# Patient Record
Sex: FEMALE | Race: WHITE | Employment: OTHER | URBAN - METROPOLITAN AREA
[De-identification: names, ages, dates, MRNs, and addresses within clinical notes are randomized per-mention and may not be internally consistent; named-entity substitution may affect disease eponyms.]

---

## 2017-02-21 ENCOUNTER — TELEPHONE (OUTPATIENT)
Dept: CARDIOLOGY CLINIC | Age: 62
End: 2017-02-21

## 2017-12-07 ENCOUNTER — OFFICE VISIT (OUTPATIENT)
Dept: CARDIOLOGY CLINIC | Age: 62
End: 2017-12-07

## 2017-12-07 VITALS
WEIGHT: 159 LBS | SYSTOLIC BLOOD PRESSURE: 138 MMHG | DIASTOLIC BLOOD PRESSURE: 82 MMHG | HEART RATE: 80 BPM | HEIGHT: 58 IN | BODY MASS INDEX: 33.37 KG/M2

## 2017-12-07 DIAGNOSIS — I10 ESSENTIAL HYPERTENSION: ICD-10-CM

## 2017-12-07 DIAGNOSIS — R55 SYNCOPE AND COLLAPSE: ICD-10-CM

## 2017-12-07 DIAGNOSIS — Z72.0 TOBACCO ABUSE: Primary | ICD-10-CM

## 2017-12-07 DIAGNOSIS — R42 DIZZY SPELLS: ICD-10-CM

## 2017-12-07 PROCEDURE — 99214 OFFICE O/P EST MOD 30 MIN: CPT | Performed by: INTERNAL MEDICINE

## 2017-12-07 NOTE — PROGRESS NOTES
Alejandra Busby  1955  Tim Angry, DO    Chief complaint and HPI:  Alejandra Busby  this 72-year-old female is here for follow-up for syncope. She described the episode that she had a bowel movement and felt dizzy and she fell in the bathroom. She states that she did not pass out. She went to [facility] in [place] and I have reviewed the records from that hospital.  She had carotid ultrasound and echocardiogram which were unremarkable and the telemetry did not report any arrhythmias. She has significant issues with her back. I have reviewed the results of her facet neuropathy on lumbar MRI from [facility] from June. She has seen Dr. Ministerio Ibarra and had one lumbar injection done and she believes that that messed her right lower side and she has never felt better since then. She is frustrated and depressed that she is not able to obtain her pain medications and she has been told that she has to go through 2 more injections and she does not feel that she wants to do that. She is emotionally labile and she is accompanied with her . She continues to smoke and trying to wean herself off. She denies any chest pain were reports more fatigue and shortness of breath on activity. She states that she had several cardiac cath by Dr. Em Cabrera in the past and she has been told that she did not have any blockages. She was apparently on some cholesterol medications. She did not bring it with her. She was taking it when she had a blood test done on November 20 and had results are reviewed and discussed with her. She stopped the medication because of aches and pains she was having and she did not feel any better as far as the aches and pains are concerned off of the medications. She is willing to go back on it. Cardiovascular system review is otherwise unchanged from prior encounter. Past medical history:  has a past medical history of CAD (coronary artery disease); Dizzy spells;  Family venous pressure: Not elevated. Heart[de-identified] Hear sounds are normal.   Peripheral Pulses: 1+   Extremities: No edema  Chest: Normal  Lungs: Clear to auscultation with diminished air entry   Abdomen: Soft non tender. Bowel sounds are normal. No organomegaly or ascites.   Musculoskeletal: WNL   Skin: Normal in color and texture. No rash   Psychiatric: Normal mood and effect.    Neurologic exam:  No focal deficit    11.2017 At [facility]    RIGHT CAROTID:              Vessel                                       Peak systolic velocities(cm/s):   Proximal right common carotid artery:                69.6 cm/s. Proximal right internal carotid artery:                    80.1 cm/s. ECA :                                                                    88.8 cm/s. Peak end-diastolic velocity of the Proximal Right internal carotid artery: 23 cm/s. ICA/CCA ratio equals 1.3. There is No significant plaque within the right internal carotid artery.         There is antegrade flow within the right vertebral artery with normal velocity. There is triphasic flow within the right subclavian artery with normal velocity.      LEFT CAROTID:              Vessel                                       Peak systolic velocities(cm/s):   Proximal left common carotid artery:               79.5 cm/s. Proximal left internal carotid artery:                       80.8 cm/s. ECA :                                                                    98.8 cm/s. Peak end-diastolic velocity of the Proximal Left internal carotid artery: 22.4 cm/s. ICA/CCA ratio equals 1.2. There is fibrocalcific smooth appearing plaque within the  left internal carotid artery.            There is antegrade flow within the left vertebral artery with normal velocity. There is triphasic flow within the left subclavian artery with normal velocity.      11/2017 ECHO at [facility] reported  1. Left ventricle:  The cavity size was normal. Wall thickness was     normal. Systolic function was normal. The estimated ejection     fraction was in the range of 60% to 65%. The calculated EF is     65%. Wall motion was normal; there were no regional wall motion     abnormalities. 2. Mitral valve: There was mild regurgitation. LAB REVIEW:    Hospital Encounter on 11/26/2017  [facility]")' href=\"[email or web address]://[email or web address]. [id number]/\">11/27/2017   Hospital Encounter on 11/26/2017  [facility]")' href=\"epic://[email or web address]. [id number]/\">11/26/2017   Hospital Encounter on 11/19/2017  [facility]")' href=\"epic://[email or web address]. [id number]/\">11/19/2017   Hospital Encounter on 2/24/2015  [facility]")' href=\"epic://[email or web address]. [id number]/\">2/24/2015   Hospital Encounter on 1/3/2015  [facility]")' href=\"epic://[email or web address]. [id number]/\">1/3/2015   Hospital Encounter on 7/30/2013  [facility]")' href=\"epic://[email or web address]. [id number]/\">7/30/2013   Hospital Encounter on 4/17/2013  [facility]")' href=\"[email or web address]://[email or web address]. [id number]/\">4/17/2013       Specimen: Blood\")'>6.7   Specimen: Blood - Line\")'>9.7   Specimen: Blood - Line\")'>8.0   Specimen: Blood - Vein\")'>13.5 (A)   Specimen: Blood\")'>9.5   Specimen: Blood - Line\")'>8.2   Specimen: Blood - Line\")'>7.5     Specimen: Blood\")'>3.30 (A)   Specimen: Blood - Line\")'>3.59 (A)   Specimen: Blood - Line\")'>3.80 (A)   Specimen: Blood - Vein\")'>4.98   Specimen: Blood\")'>5.12   Specimen: Blood - Line\")'>4.75   Specimen: Blood - Line\")'>4.72     Specimen: Blood\")'>8.9 (A)   Specimen: Blood - Line\")'>10.0 (A)   Specimen: Blood - Line\")'>10.8 (A)   Specimen: Blood - Vein\")'>15.1   Specimen: Blood\")'>15.2   Specimen: Blood - Line\")'>14.7   Specimen: Blood - (A)   Specimen: Blood - Line\")'>0   Specimen: Blood - Line\")'>1     Specimen: Blood\")'>3.4   Specimen: Blood - Line\")'>3.1   Specimen: Blood - Line\")'>3.3   Specimen: Blood - Vein\")'>3.6   Specimen: Blood\")'>4.2 (A)         Specimen: Blood\")'>0.4   Specimen: Blood - Line\")'>0.5        Lipids:     Hospital Encounter on 11/19/2017  [address] [facility]\")' href=\"[email or web address]://[email or web address]. [id number]/\">11/20/2017     View Detailed Result Report  Lipid Panel,CBC and CMP  11/20/2017\")' href=\"epic[email or web address]. [id number] Panel,CBC and CMP/\">  Specimen: Blood\")'>124   View Detailed Result Report  Lipid Panel,CBC and CMP  11/20/2017\")' href=\"[email or web address]. [id number] Panel,CBC and CMP/\">  Specimen: Blood\")'>190 (A)   View Detailed Result Report  Lipid Panel,CBC and CMP  11/20/2017\")' href=\"[email or web address]. [id number] Panel,CBC and CMP/\">  Specimen: Blood\")'>51   View Detailed Result Report  Lipid Panel,CBC and CMP  11/20/2017\")' href=\"[email or web address]. [id number] Panel,CBC and CMP/\">  Specimen: Blood\")'>35   View Detailed Result Report  Lipid Panel,CBC and CMP  11/20/2017\")' href=\"[email or web address]. [id number] Panel,CBC and CMP/\">  Specimen: Blood\")'>38       Specimen: Blood\")'>103   Specimen: Blood - Line\")'>129 (A)     Specimen: Blood - Line\")'>101   Specimen: Blood - Vein\")'>117 (A)   Specimen: Blood\")'>95   Specimen: Blood - Line\")'>116 (H)   Specimen: Blood - Line\")'>90     Specimen: Blood\")'>14   Specimen: Blood - Line\")'>18     Specimen: Blood - Line\")'>15   Specimen: Blood - Vein\")'>18   Specimen: Blood\")'>12   Specimen: Blood - Line\")'>15   Specimen: Blood - Line\")'>10     Specimen: Blood\")'>0.6   Specimen: Blood - Line\")'>0.6     Specimen: Blood - Line\")'>0.6 Specimen: Blood - Vein\")'>0.9   Specimen: Blood\")'>0.8   Specimen: Blood - Line\")'>0.67   Specimen: Blood - Line\")'>0.64     Specimen: Blood\")'>8.0 (A)   Specimen: Blood - Line\")'>8.6     Specimen: Blood - Line\")'>8.5   Specimen: Blood - Vein\")'>8.7   Specimen: Blood\")'>9   Specimen: Blood - Line\")'>9.0   Specimen: Blood - Line\")'>9.3     Specimen: Blood\")'>144   Specimen: Blood - Line\")'>143     Specimen: Blood - Line\")'>142   Specimen: Blood - Vein\")'>140   Specimen: Blood\")'>142   Specimen: Blood - Line\")'>136   Specimen: Blood - Line\")'>140     Specimen: Blood\")'>4.0   Specimen: Blood - Line\")'>3.9     Specimen: Blood - Line\")'>4.1   Specimen: Blood - Vein\")'>3.8   Specimen: Blood\")'>4.3   Specimen: Blood - Line\")'>4.0   Specimen: Blood - Line\")'>4.1     Specimen: Blood\")'>114 (A)   Specimen: Blood - Line\")'>111 (A)     Specimen: Blood - Line\")'>109 (A)   Specimen: Blood - Vein\")'>107   Specimen: Blood\")'>104   Specimen: Blood - Line\")'>106   Specimen: Blood - Line\")'>103     Specimen: Blood\")'>24   Specimen: Blood - Line\")'>25     Specimen: Blood - Line\")'>26   Specimen: Blood - Vein\")'>26   Specimen: Blood\")'>29   Specimen: Blood - Line\")'>25   Specimen: Blood - Line\")'>25     Specimen: Blood\")'>6 (A)   Specimen: Blood - Line\")'>7     Specimen: Blood - Line\")'>7   Specimen: Blood - Vein\")'>7   Specimen: Blood\")'>9   Specimen: Blood - Line\")'>4   Specimen: Blood - Line\")'>11                IMPRESSION and RECOMMENDATIONS:      Tobacco abuse  Patient is counseled for smoking cessation and refraining from passive nicotine exposure. I have offered multiple approaches and support to accomplish this. Syncope and collapse  30 day one monitor is recommended and we also discussed implantable loop recorder she has declined both were normal think about them. Hypertension  Well controlled on current medications, reviewed individually with patient. 30 day event monitor to evaluate syncope.  Patient is

## 2017-12-07 NOTE — ASSESSMENT & PLAN NOTE
Patient is counseled for smoking cessation and refraining from passive nicotine exposure. I have offered multiple approaches and support to accomplish this.

## 2018-04-04 ENCOUNTER — OFFICE VISIT (OUTPATIENT)
Dept: CARDIOLOGY CLINIC | Age: 63
End: 2018-04-04

## 2018-04-04 ENCOUNTER — NURSE ONLY (OUTPATIENT)
Dept: CARDIOLOGY CLINIC | Age: 63
End: 2018-04-04

## 2018-04-04 VITALS
HEIGHT: 58 IN | HEART RATE: 88 BPM | SYSTOLIC BLOOD PRESSURE: 110 MMHG | WEIGHT: 169 LBS | BODY MASS INDEX: 35.48 KG/M2 | DIASTOLIC BLOOD PRESSURE: 60 MMHG

## 2018-04-04 DIAGNOSIS — E78.1 PURE HYPERGLYCERIDEMIA: ICD-10-CM

## 2018-04-04 DIAGNOSIS — R55 SYNCOPE AND COLLAPSE: Primary | ICD-10-CM

## 2018-04-04 DIAGNOSIS — Z72.0 TOBACCO ABUSE: ICD-10-CM

## 2018-04-04 DIAGNOSIS — I10 ESSENTIAL HYPERTENSION: ICD-10-CM

## 2018-04-04 DIAGNOSIS — R07.9 CHEST PAIN, UNSPECIFIED TYPE: ICD-10-CM

## 2018-04-04 DIAGNOSIS — R07.9 CHEST PAIN, UNSPECIFIED TYPE: Primary | ICD-10-CM

## 2018-04-04 DIAGNOSIS — R00.2 PALPITATIONS: ICD-10-CM

## 2018-04-04 DIAGNOSIS — R06.02 SOB (SHORTNESS OF BREATH) ON EXERTION: ICD-10-CM

## 2018-04-04 PROCEDURE — 99214 OFFICE O/P EST MOD 30 MIN: CPT | Performed by: INTERNAL MEDICINE

## 2018-04-04 PROCEDURE — 93000 ELECTROCARDIOGRAM COMPLETE: CPT | Performed by: INTERNAL MEDICINE

## 2018-04-04 RX ORDER — ROPINIROLE 1 MG/1
1 TABLET, FILM COATED ORAL 3 TIMES DAILY
Refills: 0 | COMMUNITY
Start: 2017-12-31

## 2018-04-04 RX ORDER — BUDESONIDE AND FORMOTEROL FUMARATE DIHYDRATE 160; 4.5 UG/1; UG/1
2 AEROSOL RESPIRATORY (INHALATION) 2 TIMES DAILY
COMMUNITY
Start: 2018-04-03 | End: 2018-05-10

## 2018-04-04 RX ORDER — MELOXICAM 15 MG/1
1 TABLET ORAL DAILY
Refills: 2 | COMMUNITY
Start: 2018-03-14

## 2018-04-04 RX ORDER — HYDROCODONE BITARTRATE AND ACETAMINOPHEN 5; 325 MG/1; MG/1
1 TABLET ORAL EVERY 6 HOURS PRN
Refills: 0 | COMMUNITY
Start: 2018-03-13

## 2018-05-10 ENCOUNTER — OFFICE VISIT (OUTPATIENT)
Dept: CARDIOLOGY CLINIC | Age: 63
End: 2018-05-10

## 2018-05-10 VITALS
BODY MASS INDEX: 35.26 KG/M2 | SYSTOLIC BLOOD PRESSURE: 138 MMHG | WEIGHT: 168 LBS | HEART RATE: 80 BPM | DIASTOLIC BLOOD PRESSURE: 78 MMHG | HEIGHT: 58 IN

## 2018-05-10 DIAGNOSIS — Z82.49 FAMILY HISTORY OF CORONARY ARTERY DISEASE: ICD-10-CM

## 2018-05-10 DIAGNOSIS — R00.2 PALPITATIONS: ICD-10-CM

## 2018-05-10 DIAGNOSIS — Z72.0 TOBACCO ABUSE: Primary | ICD-10-CM

## 2018-05-10 PROCEDURE — 99213 OFFICE O/P EST LOW 20 MIN: CPT | Performed by: INTERNAL MEDICINE

## 2019-01-09 ENCOUNTER — TELEPHONE (OUTPATIENT)
Dept: CARDIOLOGY CLINIC | Age: 64
End: 2019-01-09

## 2019-11-26 ENCOUNTER — HOSPITAL ENCOUNTER (OUTPATIENT)
Dept: WOMENS IMAGING | Age: 64
Discharge: HOME | End: 2019-11-26
Payer: COMMERCIAL

## 2019-11-26 DIAGNOSIS — Z12.31 VISIT FOR SCREENING MAMMOGRAM: ICD-10-CM

## 2019-11-26 PROCEDURE — 77067 SCR MAMMO BI INCL CAD: CPT

## 2023-02-22 ENCOUNTER — OFFICE (OUTPATIENT)
Dept: URBAN - METROPOLITAN AREA CLINIC 16 | Facility: CLINIC | Age: 68
End: 2023-02-22

## 2023-02-22 VITALS
SYSTOLIC BLOOD PRESSURE: 144 MMHG | WEIGHT: 210 LBS | HEART RATE: 74 BPM | OXYGEN SATURATION: 96 % | DIASTOLIC BLOOD PRESSURE: 82 MMHG | HEIGHT: 58 IN

## 2023-02-22 DIAGNOSIS — K57.90 DIVERTICULOSIS OF INTESTINE, PART UNSPECIFIED, WITHOUT PERFO: ICD-10-CM

## 2023-02-22 DIAGNOSIS — K58.2 MIXED IRRITABLE BOWEL SYNDROME: ICD-10-CM

## 2023-02-22 DIAGNOSIS — Z86.010 PERSONAL HISTORY OF COLONIC POLYPS: ICD-10-CM

## 2023-02-22 DIAGNOSIS — K55.20 ANGIODYSPLASIA OF COLON WITHOUT HEMORRHAGE: ICD-10-CM

## 2023-02-22 DIAGNOSIS — R19.4 CHANGE IN BOWEL HABIT: ICD-10-CM

## 2023-02-22 DIAGNOSIS — K64.9 UNSPECIFIED HEMORRHOIDS: ICD-10-CM

## 2023-02-22 PROCEDURE — 99215 OFFICE O/P EST HI 40 MIN: CPT | Performed by: INTERNAL MEDICINE

## 2023-02-22 NOTE — SERVICENOTES
she may have had worsening of her bowels with worsening hemorrhoidal disease and will have medical treatment for hemorrhoids in regards to fluctuations of bowels she will have fiber lactose-free and probiotic as well.  I will start her on 10 mg dicyclomine in addition to hemorrhoidal management with fluctuations of her bowels and some cramping as well

## 2023-02-22 NOTE — SERVICEHPINOTES
Geena Bird   is a   67   year old   female   patient who is seen   at the request of   Tutu Saucedo   for a consultation/initial visit.She did have colonoscopies in March of 2/20/2021 and April 2021. She does have a history of AVM disease and bleeding AVMs from the colon and her last colonoscopy in April 15, 2021 had colonoscopy with APC of bleeding AVM in the ascending colon snare polypectomy fulguration of some small polyps she had that done with my partner Dr. Bailey at [facility]. She had some mild left-sided diverticulosis on that exam as well. On examination in March of that year she had a cecal AVM and bleeding ascending colon AVM and was managed at that time as well. She had 2 hemoclips placed at that time Dr. 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 She has not been on PPI at this time  . She has high risk adenoma and sessile serrated adenoma plus family history of colon cancer and polyps and colonoscopy otherwise would be due no later than April 2026

## 2024-12-02 ENCOUNTER — APPOINTMENT (OUTPATIENT)
Facility: HOSPITAL | Age: 69
End: 2024-12-02
Payer: COMMERCIAL

## 2024-12-02 ENCOUNTER — HOSPITAL ENCOUNTER (EMERGENCY)
Facility: HOSPITAL | Age: 69
Discharge: HOME | End: 2024-12-02
Attending: STUDENT IN AN ORGANIZED HEALTH CARE EDUCATION/TRAINING PROGRAM
Payer: COMMERCIAL

## 2024-12-02 VITALS
SYSTOLIC BLOOD PRESSURE: 142 MMHG | OXYGEN SATURATION: 97 % | HEIGHT: 58 IN | BODY MASS INDEX: 45.34 KG/M2 | HEART RATE: 79 BPM | DIASTOLIC BLOOD PRESSURE: 88 MMHG | RESPIRATION RATE: 17 BRPM | TEMPERATURE: 97.8 F | WEIGHT: 216 LBS

## 2024-12-02 DIAGNOSIS — J44.1 COPD EXACERBATION (HCC): Primary | ICD-10-CM

## 2024-12-02 LAB
ALBUMIN SERPL-MCNC: 3.3 G/DL (ref 3.5–5)
ALBUMIN/GLOB SERPL: 1 (ref 1.1–2.2)
ALP SERPL-CCNC: 132 U/L (ref 45–117)
ALT SERPL-CCNC: 27 U/L (ref 12–78)
ANION GAP SERPL CALC-SCNC: 8 MMOL/L (ref 2–12)
AST SERPL-CCNC: 25 U/L (ref 15–37)
BASOPHILS # BLD: 0 K/UL (ref 0–0.1)
BASOPHILS NFR BLD: 1 % (ref 0–1)
BILIRUB SERPL-MCNC: 0.3 MG/DL (ref 0.2–1)
BUN SERPL-MCNC: 15 MG/DL (ref 6–20)
BUN/CREAT SERPL: 19 (ref 12–20)
CALCIUM SERPL-MCNC: 8.5 MG/DL (ref 8.5–10.1)
CHLORIDE SERPL-SCNC: 104 MMOL/L (ref 97–108)
CO2 SERPL-SCNC: 29 MMOL/L (ref 21–32)
CREAT SERPL-MCNC: 0.79 MG/DL (ref 0.55–1.02)
D DIMER PPP FEU-MCNC: 0.31 MG/L FEU (ref 0–0.65)
DIFFERENTIAL METHOD BLD: ABNORMAL
EOSINOPHIL # BLD: 0.1 K/UL (ref 0–0.4)
EOSINOPHIL NFR BLD: 4 % (ref 0–7)
ERYTHROCYTE [DISTWIDTH] IN BLOOD BY AUTOMATED COUNT: 13.9 % (ref 11.5–14.5)
GLOBULIN SER CALC-MCNC: 3.4 G/DL (ref 2–4)
GLUCOSE SERPL-MCNC: 143 MG/DL (ref 65–100)
HCT VFR BLD AUTO: 45.2 % (ref 35–47)
HGB BLD-MCNC: 14.9 G/DL (ref 11.5–16)
IMM GRANULOCYTES # BLD AUTO: 0 K/UL (ref 0–0.04)
IMM GRANULOCYTES NFR BLD AUTO: 1 % (ref 0–0.5)
LYMPHOCYTES # BLD: 1.4 K/UL (ref 0.8–3.5)
LYMPHOCYTES NFR BLD: 45 % (ref 12–49)
MAGNESIUM SERPL-MCNC: 1.7 MG/DL (ref 1.6–2.4)
MCH RBC QN AUTO: 29.9 PG (ref 26–34)
MCHC RBC AUTO-ENTMCNC: 33 G/DL (ref 30–36.5)
MCV RBC AUTO: 90.6 FL (ref 80–99)
MONOCYTES # BLD: 0.2 K/UL (ref 0–1)
MONOCYTES NFR BLD: 7 % (ref 5–13)
NEUTS SEG # BLD: 1.4 K/UL (ref 1.8–8)
NEUTS SEG NFR BLD: 43 % (ref 32–75)
NRBC # BLD: 0 K/UL (ref 0–0.01)
NRBC BLD-RTO: 0 PER 100 WBC
PLATELET # BLD AUTO: 186 K/UL (ref 150–400)
PMV BLD AUTO: 9.8 FL (ref 8.9–12.9)
POTASSIUM SERPL-SCNC: 3.9 MMOL/L (ref 3.5–5.1)
PROT SERPL-MCNC: 6.7 G/DL (ref 6.4–8.2)
RBC # BLD AUTO: 4.99 M/UL (ref 3.8–5.2)
SODIUM SERPL-SCNC: 141 MMOL/L (ref 136–145)
TROPONIN I SERPL HS-MCNC: 8 NG/L (ref 0–51)
TROPONIN I SERPL HS-MCNC: 8 NG/L (ref 0–51)
WBC # BLD AUTO: 3.1 K/UL (ref 3.6–11)

## 2024-12-02 PROCEDURE — 36415 COLL VENOUS BLD VENIPUNCTURE: CPT

## 2024-12-02 PROCEDURE — 71045 X-RAY EXAM CHEST 1 VIEW: CPT

## 2024-12-02 PROCEDURE — 6360000002 HC RX W HCPCS: Performed by: STUDENT IN AN ORGANIZED HEALTH CARE EDUCATION/TRAINING PROGRAM

## 2024-12-02 PROCEDURE — 96374 THER/PROPH/DIAG INJ IV PUSH: CPT

## 2024-12-02 PROCEDURE — 99285 EMERGENCY DEPT VISIT HI MDM: CPT

## 2024-12-02 PROCEDURE — 83735 ASSAY OF MAGNESIUM: CPT

## 2024-12-02 PROCEDURE — 85379 FIBRIN DEGRADATION QUANT: CPT

## 2024-12-02 PROCEDURE — 2580000003 HC RX 258: Performed by: STUDENT IN AN ORGANIZED HEALTH CARE EDUCATION/TRAINING PROGRAM

## 2024-12-02 PROCEDURE — 80053 COMPREHEN METABOLIC PANEL: CPT

## 2024-12-02 PROCEDURE — 93005 ELECTROCARDIOGRAM TRACING: CPT | Performed by: STUDENT IN AN ORGANIZED HEALTH CARE EDUCATION/TRAINING PROGRAM

## 2024-12-02 PROCEDURE — 85027 COMPLETE CBC AUTOMATED: CPT

## 2024-12-02 PROCEDURE — 84484 ASSAY OF TROPONIN QUANT: CPT

## 2024-12-02 PROCEDURE — 6370000000 HC RX 637 (ALT 250 FOR IP): Performed by: STUDENT IN AN ORGANIZED HEALTH CARE EDUCATION/TRAINING PROGRAM

## 2024-12-02 RX ORDER — DOXYCYCLINE HYCLATE 100 MG
100 TABLET ORAL 2 TIMES DAILY
Qty: 14 TABLET | Refills: 0 | Status: SHIPPED | OUTPATIENT
Start: 2024-12-02 | End: 2024-12-09

## 2024-12-02 RX ORDER — IPRATROPIUM BROMIDE AND ALBUTEROL SULFATE 2.5; .5 MG/3ML; MG/3ML
1 SOLUTION RESPIRATORY (INHALATION) EVERY 4 HOURS PRN
Qty: 120 ML | Refills: 0 | Status: SHIPPED | OUTPATIENT
Start: 2024-12-02

## 2024-12-02 RX ORDER — IPRATROPIUM BROMIDE AND ALBUTEROL SULFATE 2.5; .5 MG/3ML; MG/3ML
1 SOLUTION RESPIRATORY (INHALATION)
Status: COMPLETED | OUTPATIENT
Start: 2024-12-02 | End: 2024-12-02

## 2024-12-02 RX ORDER — PREDNISONE 20 MG/1
40 TABLET ORAL DAILY
Qty: 10 TABLET | Refills: 0 | Status: SHIPPED | OUTPATIENT
Start: 2024-12-02 | End: 2024-12-07

## 2024-12-02 RX ORDER — NEBULIZER ACCESSORIES
1 KIT MISCELLANEOUS DAILY PRN
Qty: 1 KIT | Refills: 0 | Status: SHIPPED | OUTPATIENT
Start: 2024-12-02

## 2024-12-02 RX ADMIN — IPRATROPIUM BROMIDE AND ALBUTEROL SULFATE 1 DOSE: 2.5; .5 SOLUTION RESPIRATORY (INHALATION) at 12:05

## 2024-12-02 RX ADMIN — WATER 125 MG: 1 INJECTION INTRAMUSCULAR; INTRAVENOUS; SUBCUTANEOUS at 12:03

## 2024-12-02 RX ADMIN — IPRATROPIUM BROMIDE AND ALBUTEROL SULFATE 1 DOSE: 2.5; .5 SOLUTION RESPIRATORY (INHALATION) at 12:10

## 2024-12-02 RX ADMIN — IPRATROPIUM BROMIDE AND ALBUTEROL SULFATE 1 DOSE: 2.5; .5 SOLUTION RESPIRATORY (INHALATION) at 12:03

## 2024-12-02 NOTE — ED PROVIDER NOTES
[facility] EMERGENCY DEPT  EMERGENCY DEPARTMENT ENCOUNTER      Pt Name: Cherry Butt  MRN: [id number]  Birthdate 1955  Date of evaluation: 12/2/2024  Provider: Delmy Greene MD    CHIEF COMPLAINT       Chief Complaint   Patient presents with    Shortness of Breath         HISTORY OF PRESENT ILLNESS   (Location/Symptom, Timing/Onset, Context/Setting, Quality, Duration, Modifying Factors, Severity)  Note limiting factors.   The history is provided by the patient.     69-year-old female with history of CAD, COPD, hypertension, hyperlipidemia presenting for shortness of breath.  Reports she has had cough and shortness of breath worsening over the last week.  Reports increased green mucus production.  Reports borderline temps 99 Fahrenheit at home. Pt reports more symptoms at night when laying down. More difficulty breathing with certain exertional activity.  Reports no chest pain.  Reports no swelling in arms or legs.  Reports some scant hemoptysis in green mucus specks of blood.  Reports recent travel to [place], denies surgeries, denies hormone therapy, reports no history of blood clots.    Review of External Medical Records:         Nursing Notes were reviewed.      REVIEW OF SYSTEMS    (2-9 systems for level 4, 10 or more for level 5)     Except as noted above the remainder of the review of systems was reviewed and negative.       PAST MEDICAL HISTORY     Past Medical History:   Diagnosis Date    CAD (coronary artery disease)     cath x 3 by Dr Martinez last 2007 ? CAD    Dizzy spells 11/9/2016    When she turns on the right side in sleep    Event Monitor 05/2018    Normal sinus rhythm and sinus tachycardia with symptoms of palpitations    Family history of coronary artery disease     H/O echocardiogram 11/16/2016    Trace MR/TR EF60%.     History of exercise stress test 11/16/2016    treadmill    Holter monitor, abnormal 11/16/2016    Short runs of SVT.    Hx of echocardiogram 11/16/2016    trace mitral and

## 2024-12-02 NOTE — ED NOTES
1515  Change of shift. Care of patient taken over from Dr. Greene; H&P, labs, imaging reviewed, handoff complete.  Awaiting labs/imaging/consultant.    ED Course as of 12/02/24 1736   Mon Dec 02, 2024   1128 EKG shows normal sinus rhythm, 80 bpm, ST abnormality, .5mm ST elevation in lead III, aVF, depression in aVL, no STEMI [SL]   1240 XR CHEST PORTABLE  Mild left basilar atelectasis. [SL]   1339 Sodium: 141 [SL]   1339 Potassium: 3.9 [SL]   1339 Creatinine: 0.79 [SL]   1339 WBC(!): 3.1 [SL]   1339 Hemoglobin Quant: 14.9 [SL]   1339 Troponin, High Sensitivity: 8 [SL]   1339 Magnesium: 1.7 [SL]   1436 Cardiology reviewed EKG, III, AvF stable from prior, Admit for ACS evaluation [SL]   1501 Advised patient on recommendation for admission given patient's history and EKG changes and cardiology recommendations, patient has declined, reports she recently had cardiology evaluation in [place] less than a month ago with a normal stress test.  Records not available for review [SL]   1516 Troponin, High Sensitivity: 8  Repeat trop pending.  [AL]   1517 Signout. Repeat troponin. SOB and chest pain. Cards recommended admit. Patient wants to go home. Cards states EKG unchanged from previous. Abx, steroids RX for DC. [AL]   1628 Troponin, High Sensitivity: 8 [AL]      ED Course User Index  [AL] Dequan Allen MD  [SL] Delmy Greene MD       Laboratory Results:  Labs Reviewed   CBC - Abnormal; Notable for the following components:       Result Value    WBC 3.1 (*)     All other components within normal limits   AUTOMATED DIFFERENTIAL - Abnormal; Notable for the following components:    Immature Granulocytes % 1 (*)     Neutrophils Absolute 1.4 (*)     All other components within normal limits   COMPREHENSIVE METABOLIC PANEL - Abnormal; Notable for the following components:    Glucose 143 (*)     Alk Phosphatase 132 (*)     Albumin 3.3 (*)     Albumin/Globulin Ratio 1.0 (*)     All other components within normal limits

## 2024-12-02 NOTE — ED TRIAGE NOTES
Patient ambulatory into ED c/o cough and congestion with worsening SOB x 1 week. Patient w hx of COPD. Patient with O2 sat of 89% after walking back from waiting room.

## 2024-12-02 NOTE — ED NOTES
Patient ambulated down hallway to bathroom wearing pulse ox.   Lowest recorded O2 sat reading was 91%. Patient states feeling better than when she came in.   Once seated, sat returned to 93%.

## 2024-12-02 NOTE — DISCHARGE INSTRUCTIONS
You presented to ED with shortness of breath.  Most like this is a COPD exacerbation.  Steroids and nebulizer treatment given here in the ED.  You have a complex medical history.  Cardiology was consulted and recommended hospital admission however he would like to go home.  With 2 troponins within normal limits and EKG unchanged you are stable for discharge home.    Prescriptions for COPD treatment with doxycycline, nebulizer treatments and prednisone.  Recommend using the nebulizer every 4 hours for the next 24 hours then as needed every 4 hours

## 2024-12-04 LAB
EKG ATRIAL RATE: 80 BPM
EKG DIAGNOSIS: NORMAL
EKG P AXIS: 49 DEGREES
EKG P-R INTERVAL: 164 MS
EKG Q-T INTERVAL: 370 MS
EKG QRS DURATION: 84 MS
EKG QTC CALCULATION (BAZETT): 426 MS
EKG R AXIS: 25 DEGREES
EKG T AXIS: 117 DEGREES
EKG VENTRICULAR RATE: 80 BPM

## 2024-12-04 PROCEDURE — 93010 ELECTROCARDIOGRAM REPORT: CPT | Performed by: STUDENT IN AN ORGANIZED HEALTH CARE EDUCATION/TRAINING PROGRAM
